# Patient Record
(demographics unavailable — no encounter records)

---

## 2024-12-09 NOTE — PHYSICAL EXAM
[Abdomen Soft] : soft [Edema] : no peripheral edema [] : no respiratory distress [Normal Station and Gait] : the gait and station were normal for the patient's age [FreeTextEntry1] : +SP tenderness

## 2024-12-09 NOTE — HISTORY OF PRESENT ILLNESS
[FreeTextEntry1] : 88 male w/ PMH of CAD s/p CABG, HTN, hypothyroidism who presents with consultation for microhematuria.  it was at a time he had a UTI. was given antibiotics, UTI sx resolved. never seen blood in his urine. previous UA: ++ RBCs ++ Leuk esterase. UCx: + E Coli (in HIE) Smoker: former smoker Yes No family history of  malignancies. anticoagulants / Aspirin: no office OMH=489 cc SCR = 1.4 (june 23)   - from 1.1 (jan 23)  pelvis: Post void volume: 152 ml // PS= 47 cc  August 2023: PVR = 217 cc / SCr=1.1 (july 2023) sept 2023: s/p TURP - doing well, clear urine - path benign 3g // Office PVR = 202cc dec 2023: office PVR = 160 cc/ still on flomax 0.4 QHS june 2024: sx on flomax? yes / PVR = 212 / has no symptoms, no complaints. sleeps through the night Dec 2024: Office PVR = 182 cc // Last SCr=1.37 / GFR =50

## 2024-12-09 NOTE — HISTORY OF PRESENT ILLNESS
[FreeTextEntry1] : 88 male w/ PMH of CAD s/p CABG, HTN, hypothyroidism who presents with consultation for microhematuria.  it was at a time he had a UTI. was given antibiotics, UTI sx resolved. never seen blood in his urine. previous UA: ++ RBCs ++ Leuk esterase. UCx: + E Coli (in HIE) Smoker: former smoker Yes No family history of  malignancies. anticoagulants / Aspirin: no office QTQ=221 cc SCR = 1.4 (june 23)   - from 1.1 (jan 23)  pelvis: Post void volume: 152 ml // PS= 47 cc  August 2023: PVR = 217 cc / SCr=1.1 (july 2023) sept 2023: s/p TURP - doing well, clear urine - path benign 3g // Office PVR = 202cc dec 2023: office PVR = 160 cc/ still on flomax 0.4 QHS june 2024: sx on flomax? yes / PVR = 212 / has no symptoms, no complaints. sleeps through the night Dec 2024: Office PVR = 182 cc // Last SCr=1.37 / GFR =50

## 2024-12-09 NOTE — ASSESSMENT
[FreeTextEntry1] : sept 2023: s/p TURP - doing well, clear urine - path benign // Office PVR = 202cc dec 2023: office PVR = 160 cc/ still on Flomax 0.4 QHS June 2024: sx on Flomax? yes / PVR = 212 Dec 2024: Office PVR = 182 cc // Last SCr=1.37 / GFR =50  plan RTC 6 months continue Flomax 0.4 QD BMP in 6 months

## 2024-12-09 NOTE — LETTER BODY
[Dear  ___] : Dear  [unfilled], [Consult Letter:] : I had the pleasure of evaluating your patient, [unfilled]. [Please see my note below.] : Please see my note below. [Consult Closing:] : Thank you very much for allowing me to participate in the care of this patient.  If you have any questions, please do not hesitate to contact me. [Sincerely,] : Sincerely, [FreeTextEntry3] : Marta Kramer MD\par  Urologic Oncology\par  Robotic & Endoscopic urology\par  [facility] [place]\par  [facility]\par

## 2024-12-09 NOTE — LETTER BODY
[Dear  ___] : Dear  [unfilled], [Consult Letter:] : I had the pleasure of evaluating your patient, [unfilled]. [Please see my note below.] : Please see my note below. [Consult Closing:] : Thank you very much for allowing me to participate in the care of this patient.  If you have any questions, please do not hesitate to contact me. [Sincerely,] : Sincerely, [FreeTextEntry3] : Marta Kramer MD\par  Urologic Oncology\par  Robotic & Endoscopic urology\par  [facility] at [place]\par  [facility]\par

## 2025-07-07 NOTE — ASSESSMENT
[FreeTextEntry1] : sept 2023: s/p TURP - doing well, clear urine - path benign // Office PVR = 202cc dec 2023: office PVR = 160 cc/ still on Flomax 0.4 QHS June 2024: sx on Flomax? yes / PVR = 212 Dec 2024: Office PVR = 182 cc // Last SCr=1.37 / GFR =50 July 2025: SCr= 1.16 ---- best it has ever been. any new sx? none. still Flomax? yes  plan RTC 9 months continue Flomax 0.4 QD

## 2025-07-07 NOTE — LETTER BODY
[Dear  ___] : Dear  [unfilled], [Consult Letter:] : I had the pleasure of evaluating your patient, [unfilled]. [Please see my note below.] : Please see my note below. [Consult Closing:] : Thank you very much for allowing me to participate in the care of this patient.  If you have any questions, please do not hesitate to contact me. [Sincerely,] : Sincerely, [FreeTextEntry3] : Marta Kramer MD\par  Urologic Oncology\par  Robotic & Endoscopic urology\par  [facility] at [place]\par  [facility]\par

## 2025-07-07 NOTE — HISTORY OF PRESENT ILLNESS
[FreeTextEntry1] : 90 yo male w/ PMH of CAD s/p CABG, HTN, hypothyroidism who presents with consultation for microhematuria.  previous UA: ++ RBCs ++ Leuk esterase. UCx: + E Coli (in HIE) Smoker: former smoker Yes anticoagulants / Aspirin: no office EYG=233 cc SCR = 1.4 (june 23)   - from 1.1 (jan 23)  pelvis: Post void volume: 152 ml // PS= 47 cc  August 2023: PVR = 217 cc / SCr=1.1 (july 2023) sept 2023: s/p TURP - doing well, clear urine - path benign 3g // Office PVR = 202cc dec 2023: office PVR = 160 cc/ still on flomax 0.4 QHS june 2024: sx on flomax? yes / PVR = 212 / has no symptoms, no complaints. sleeps through the night Dec 2024: Office PVR = 182 cc // Last SCr=1.37 / GFR =50 July 2025: SCr= 1.16 ---- best it has ever been. any new sx? none. still Flomax? yes

## 2025-07-07 NOTE — LETTER BODY
[Dear  ___] : Dear  [unfilled], [Consult Letter:] : I had the pleasure of evaluating your patient, [unfilled]. [Please see my note below.] : Please see my note below. [Consult Closing:] : Thank you very much for allowing me to participate in the care of this patient.  If you have any questions, please do not hesitate to contact me. [Sincerely,] : Sincerely, [FreeTextEntry3] : Marta Kramer MD\par  Urologic Oncology\par  Robotic & Endoscopic urology\par  [facility] at [place]\par  [facility]\par

## 2025-07-07 NOTE — HISTORY OF PRESENT ILLNESS
[FreeTextEntry1] : 88 yo male w/ PMH of CAD s/p CABG, HTN, hypothyroidism who presents with consultation for microhematuria.  previous UA: ++ RBCs ++ Leuk esterase. UCx: + E Coli (in HIE) Smoker: former smoker Yes anticoagulants / Aspirin: no office BEQ=313 cc SCR = 1.4 (june 23)   - from 1.1 (jan 23)  pelvis: Post void volume: 152 ml // PS= 47 cc  August 2023: PVR = 217 cc / SCr=1.1 (july 2023) sept 2023: s/p TURP - doing well, clear urine - path benign 3g // Office PVR = 202cc dec 2023: office PVR = 160 cc/ still on flomax 0.4 QHS june 2024: sx on flomax? yes / PVR = 212 / has no symptoms, no complaints. sleeps through the night Dec 2024: Office PVR = 182 cc // Last SCr=1.37 / GFR =50 July 2025: SCr= 1.16 ---- best it has ever been. any new sx? none. still Flomax? yes